# Patient Record
Sex: MALE | URBAN - METROPOLITAN AREA
[De-identification: names, ages, dates, MRNs, and addresses within clinical notes are randomized per-mention and may not be internally consistent; named-entity substitution may affect disease eponyms.]

---

## 2018-07-01 ENCOUNTER — OUTPATIENT (OUTPATIENT)
Dept: OUTPATIENT SERVICES | Facility: HOSPITAL | Age: 58
LOS: 1 days | End: 2018-07-01
Payer: MEDICARE

## 2018-07-12 DIAGNOSIS — Z71.89 OTHER SPECIFIED COUNSELING: ICD-10-CM

## 2019-10-01 PROCEDURE — G9005: CPT

## 2019-11-09 ENCOUNTER — INPATIENT (INPATIENT)
Facility: HOSPITAL | Age: 59
LOS: 0 days | DRG: 283 | End: 2019-11-10
Attending: STUDENT IN AN ORGANIZED HEALTH CARE EDUCATION/TRAINING PROGRAM | Admitting: STUDENT IN AN ORGANIZED HEALTH CARE EDUCATION/TRAINING PROGRAM
Payer: MEDICARE

## 2019-11-09 VITALS
DIASTOLIC BLOOD PRESSURE: 57 MMHG | SYSTOLIC BLOOD PRESSURE: 78 MMHG | HEART RATE: 114 BPM | HEIGHT: 73 IN | WEIGHT: 259.26 LBS

## 2019-11-09 DIAGNOSIS — N25.81 SECONDARY HYPERPARATHYROIDISM OF RENAL ORIGIN: ICD-10-CM

## 2019-11-09 DIAGNOSIS — N18.6 END STAGE RENAL DISEASE: ICD-10-CM

## 2019-11-09 DIAGNOSIS — I10 ESSENTIAL (PRIMARY) HYPERTENSION: ICD-10-CM

## 2019-11-09 DIAGNOSIS — I21.3 ST ELEVATION (STEMI) MYOCARDIAL INFARCTION OF UNSPECIFIED SITE: ICD-10-CM

## 2019-11-09 DIAGNOSIS — Z89.511 ACQUIRED ABSENCE OF RIGHT LEG BELOW KNEE: Chronic | ICD-10-CM

## 2019-11-09 LAB
ALBUMIN SERPL ELPH-MCNC: 3.3 G/DL — SIGNIFICANT CHANGE UP (ref 3.3–5)
ALBUMIN SERPL ELPH-MCNC: 3.3 G/DL — SIGNIFICANT CHANGE UP (ref 3.3–5)
ALBUMIN SERPL ELPH-MCNC: 3.6 G/DL — SIGNIFICANT CHANGE UP (ref 3.3–5)
ALP SERPL-CCNC: 83 U/L — SIGNIFICANT CHANGE UP (ref 40–120)
ALP SERPL-CCNC: 86 U/L — SIGNIFICANT CHANGE UP (ref 40–120)
ALP SERPL-CCNC: 88 U/L — SIGNIFICANT CHANGE UP (ref 40–120)
ALT FLD-CCNC: 13 U/L — SIGNIFICANT CHANGE UP (ref 10–45)
ALT FLD-CCNC: 16 U/L — SIGNIFICANT CHANGE UP (ref 10–45)
ALT FLD-CCNC: 21 U/L — SIGNIFICANT CHANGE UP (ref 10–45)
ANION GAP SERPL CALC-SCNC: 16 MMOL/L — SIGNIFICANT CHANGE UP (ref 5–17)
ANION GAP SERPL CALC-SCNC: 20 MMOL/L — HIGH (ref 5–17)
ANION GAP SERPL CALC-SCNC: 20 MMOL/L — HIGH (ref 5–17)
APTT BLD: 106.6 SEC — HIGH (ref 27.5–36.3)
APTT BLD: 52.9 SEC — HIGH (ref 27.5–36.3)
APTT BLD: 55.9 SEC — HIGH (ref 27.5–36.3)
AST SERPL-CCNC: 17 U/L — SIGNIFICANT CHANGE UP (ref 10–40)
AST SERPL-CCNC: 59 U/L — HIGH (ref 10–40)
AST SERPL-CCNC: 88 U/L — HIGH (ref 10–40)
BASE EXCESS BLDV CALC-SCNC: 2 MMOL/L — SIGNIFICANT CHANGE UP (ref -2–2)
BASOPHILS # BLD AUTO: 0.05 K/UL — SIGNIFICANT CHANGE UP (ref 0–0.2)
BASOPHILS NFR BLD AUTO: 0.5 % — SIGNIFICANT CHANGE UP (ref 0–2)
BILIRUB SERPL-MCNC: 0.4 MG/DL — SIGNIFICANT CHANGE UP (ref 0.2–1.2)
BLD GP AB SCN SERPL QL: NEGATIVE — SIGNIFICANT CHANGE UP
BUN SERPL-MCNC: 33 MG/DL — HIGH (ref 7–23)
BUN SERPL-MCNC: 36 MG/DL — HIGH (ref 7–23)
BUN SERPL-MCNC: 37 MG/DL — HIGH (ref 7–23)
CALCIUM SERPL-MCNC: 10.2 MG/DL — SIGNIFICANT CHANGE UP (ref 8.4–10.5)
CALCIUM SERPL-MCNC: 10.3 MG/DL — SIGNIFICANT CHANGE UP (ref 8.4–10.5)
CALCIUM SERPL-MCNC: 10.5 MG/DL — SIGNIFICANT CHANGE UP (ref 8.4–10.5)
CHLORIDE SERPL-SCNC: 92 MMOL/L — LOW (ref 96–108)
CHLORIDE SERPL-SCNC: 93 MMOL/L — LOW (ref 96–108)
CHLORIDE SERPL-SCNC: 94 MMOL/L — LOW (ref 96–108)
CHOLEST SERPL-MCNC: 132 MG/DL — SIGNIFICANT CHANGE UP (ref 10–199)
CK MB BLD-MCNC: 10 % — HIGH (ref 0–3.5)
CK MB BLD-MCNC: 10.2 % — HIGH (ref 0–3.5)
CK MB BLD-MCNC: 5 % — HIGH (ref 0–3.5)
CK MB CFR SERPL CALC: 5.8 NG/ML — SIGNIFICANT CHANGE UP (ref 0–6.7)
CK MB CFR SERPL CALC: 54.5 NG/ML — HIGH (ref 0–6.7)
CK MB CFR SERPL CALC: 76.2 NG/ML — HIGH (ref 0–6.7)
CK SERPL-CCNC: 115 U/L — SIGNIFICANT CHANGE UP (ref 30–200)
CK SERPL-CCNC: 543 U/L — HIGH (ref 30–200)
CK SERPL-CCNC: 746 U/L — HIGH (ref 30–200)
CO2 BLDV-SCNC: 28 MMOL/L — SIGNIFICANT CHANGE UP (ref 22–30)
CO2 SERPL-SCNC: 20 MMOL/L — LOW (ref 22–31)
CO2 SERPL-SCNC: 22 MMOL/L — SIGNIFICANT CHANGE UP (ref 22–31)
CO2 SERPL-SCNC: 23 MMOL/L — SIGNIFICANT CHANGE UP (ref 22–31)
CREAT SERPL-MCNC: 8.58 MG/DL — HIGH (ref 0.5–1.3)
CREAT SERPL-MCNC: 8.85 MG/DL — HIGH (ref 0.5–1.3)
CREAT SERPL-MCNC: 8.99 MG/DL — HIGH (ref 0.5–1.3)
EOSINOPHIL # BLD AUTO: 0.04 K/UL — SIGNIFICANT CHANGE UP (ref 0–0.5)
EOSINOPHIL NFR BLD AUTO: 0.4 % — SIGNIFICANT CHANGE UP (ref 0–6)
GAS PNL BLDV: SIGNIFICANT CHANGE UP
GLUCOSE BLDC GLUCOMTR-MCNC: 177 MG/DL — HIGH (ref 70–99)
GLUCOSE SERPL-MCNC: 194 MG/DL — HIGH (ref 70–99)
GLUCOSE SERPL-MCNC: 201 MG/DL — HIGH (ref 70–99)
GLUCOSE SERPL-MCNC: 222 MG/DL — HIGH (ref 70–99)
HBA1C BLD-MCNC: 6.2 % — HIGH (ref 4–5.6)
HCO3 BLDV-SCNC: 26 MMOL/L — SIGNIFICANT CHANGE UP (ref 21–29)
HCT VFR BLD CALC: 30.1 % — LOW (ref 39–50)
HDLC SERPL-MCNC: 67 MG/DL — SIGNIFICANT CHANGE UP
HGB BLD-MCNC: 9.5 G/DL — LOW (ref 13–17)
HOROWITZ INDEX BLDV+IHG-RTO: 40 — SIGNIFICANT CHANGE UP
IMM GRANULOCYTES NFR BLD AUTO: 0.3 % — SIGNIFICANT CHANGE UP (ref 0–1.5)
INR BLD: 1.18 RATIO — HIGH (ref 0.88–1.16)
INR BLD: 1.18 RATIO — HIGH (ref 0.88–1.16)
LACTATE SERPL-SCNC: 1.7 MMOL/L — SIGNIFICANT CHANGE UP (ref 0.7–2)
LIPID PNL WITH DIRECT LDL SERPL: 51 MG/DL — SIGNIFICANT CHANGE UP
LYMPHOCYTES # BLD AUTO: 0.58 K/UL — LOW (ref 1–3.3)
LYMPHOCYTES # BLD AUTO: 5.7 % — LOW (ref 13–44)
MAGNESIUM SERPL-MCNC: 2.9 MG/DL — HIGH (ref 1.6–2.6)
MAGNESIUM SERPL-MCNC: 3 MG/DL — HIGH (ref 1.6–2.6)
MAGNESIUM SERPL-MCNC: 3.1 MG/DL — HIGH (ref 1.6–2.6)
MCHC RBC-ENTMCNC: 28.6 PG — SIGNIFICANT CHANGE UP (ref 27–34)
MCHC RBC-ENTMCNC: 31.6 GM/DL — LOW (ref 32–36)
MCV RBC AUTO: 90.7 FL — SIGNIFICANT CHANGE UP (ref 80–100)
MONOCYTES # BLD AUTO: 0.91 K/UL — HIGH (ref 0–0.9)
MONOCYTES NFR BLD AUTO: 8.9 % — SIGNIFICANT CHANGE UP (ref 2–14)
NEUTROPHILS # BLD AUTO: 8.64 K/UL — HIGH (ref 1.8–7.4)
NEUTROPHILS NFR BLD AUTO: 84.2 % — HIGH (ref 43–77)
NRBC # BLD: 0 /100 WBCS — SIGNIFICANT CHANGE UP (ref 0–0)
PCO2 BLDV: 42 MMHG — SIGNIFICANT CHANGE UP (ref 35–50)
PH BLDV: 7.41 — SIGNIFICANT CHANGE UP (ref 7.35–7.45)
PHOSPHATE SERPL-MCNC: 4.4 MG/DL — SIGNIFICANT CHANGE UP (ref 2.5–4.5)
PHOSPHATE SERPL-MCNC: 4.5 MG/DL — SIGNIFICANT CHANGE UP (ref 2.5–4.5)
PHOSPHATE SERPL-MCNC: 4.8 MG/DL — HIGH (ref 2.5–4.5)
PLATELET # BLD AUTO: 344 K/UL — SIGNIFICANT CHANGE UP (ref 150–400)
PO2 BLDV: 34 MMHG — SIGNIFICANT CHANGE UP (ref 25–45)
POTASSIUM SERPL-MCNC: 4.4 MMOL/L — SIGNIFICANT CHANGE UP (ref 3.5–5.3)
POTASSIUM SERPL-MCNC: 4.5 MMOL/L — SIGNIFICANT CHANGE UP (ref 3.5–5.3)
POTASSIUM SERPL-MCNC: 4.5 MMOL/L — SIGNIFICANT CHANGE UP (ref 3.5–5.3)
POTASSIUM SERPL-SCNC: 4.4 MMOL/L — SIGNIFICANT CHANGE UP (ref 3.5–5.3)
POTASSIUM SERPL-SCNC: 4.5 MMOL/L — SIGNIFICANT CHANGE UP (ref 3.5–5.3)
POTASSIUM SERPL-SCNC: 4.5 MMOL/L — SIGNIFICANT CHANGE UP (ref 3.5–5.3)
PROT SERPL-MCNC: 6.6 G/DL — SIGNIFICANT CHANGE UP (ref 6–8.3)
PROT SERPL-MCNC: 7.1 G/DL — SIGNIFICANT CHANGE UP (ref 6–8.3)
PROT SERPL-MCNC: 7.1 G/DL — SIGNIFICANT CHANGE UP (ref 6–8.3)
PROTHROM AB SERPL-ACNC: 13.5 SEC — HIGH (ref 10–12.9)
PROTHROM AB SERPL-ACNC: 13.6 SEC — HIGH (ref 10–12.9)
RBC # BLD: 3.32 M/UL — LOW (ref 4.2–5.8)
RBC # FLD: 14.8 % — HIGH (ref 10.3–14.5)
RH IG SCN BLD-IMP: POSITIVE — SIGNIFICANT CHANGE UP
SAO2 % BLDV: 62 % — LOW (ref 67–88)
SODIUM SERPL-SCNC: 132 MMOL/L — LOW (ref 135–145)
SODIUM SERPL-SCNC: 133 MMOL/L — LOW (ref 135–145)
SODIUM SERPL-SCNC: 135 MMOL/L — SIGNIFICANT CHANGE UP (ref 135–145)
TOTAL CHOLESTEROL/HDL RATIO MEASUREMENT: 2 RATIO — LOW (ref 3.4–9.6)
TRIGL SERPL-MCNC: 68 MG/DL — SIGNIFICANT CHANGE UP (ref 10–149)
TROPONIN T, HIGH SENSITIVITY RESULT: 1451 NG/L — HIGH (ref 0–51)
TROPONIN T, HIGH SENSITIVITY RESULT: 288 NG/L — HIGH (ref 0–51)
TROPONIN T, HIGH SENSITIVITY RESULT: 841 NG/L — HIGH (ref 0–51)
TSH SERPL-MCNC: 0.77 UIU/ML — SIGNIFICANT CHANGE UP (ref 0.27–4.2)
VANCOMYCIN TROUGH SERPL-MCNC: 16.3 UG/ML — SIGNIFICANT CHANGE UP (ref 10–20)
WBC # BLD: 10.25 K/UL — SIGNIFICANT CHANGE UP (ref 3.8–10.5)
WBC # FLD AUTO: 10.25 K/UL — SIGNIFICANT CHANGE UP (ref 3.8–10.5)

## 2019-11-09 PROCEDURE — 93010 ELECTROCARDIOGRAM REPORT: CPT | Mod: 76

## 2019-11-09 PROCEDURE — 99223 1ST HOSP IP/OBS HIGH 75: CPT | Mod: GC

## 2019-11-09 PROCEDURE — 71045 X-RAY EXAM CHEST 1 VIEW: CPT | Mod: 26,76

## 2019-11-09 PROCEDURE — 93306 TTE W/DOPPLER COMPLETE: CPT | Mod: 26

## 2019-11-09 PROCEDURE — 99222 1ST HOSP IP/OBS MODERATE 55: CPT | Mod: GC

## 2019-11-09 RX ORDER — CLOPIDOGREL BISULFATE 75 MG/1
75 TABLET, FILM COATED ORAL DAILY
Refills: 0 | Status: DISCONTINUED | OUTPATIENT
Start: 2019-11-09 | End: 2019-11-09

## 2019-11-09 RX ORDER — HEPARIN SODIUM 5000 [USP'U]/ML
4000 INJECTION INTRAVENOUS; SUBCUTANEOUS EVERY 6 HOURS
Refills: 0 | Status: DISCONTINUED | OUTPATIENT
Start: 2019-11-09 | End: 2019-11-10

## 2019-11-09 RX ORDER — DEXTROSE 50 % IN WATER 50 %
12.5 SYRINGE (ML) INTRAVENOUS ONCE
Refills: 0 | Status: DISCONTINUED | OUTPATIENT
Start: 2019-11-09 | End: 2019-11-10

## 2019-11-09 RX ORDER — DEXTROSE 50 % IN WATER 50 %
25 SYRINGE (ML) INTRAVENOUS ONCE
Refills: 0 | Status: DISCONTINUED | OUTPATIENT
Start: 2019-11-09 | End: 2019-11-10

## 2019-11-09 RX ORDER — CHLORHEXIDINE GLUCONATE 213 G/1000ML
1 SOLUTION TOPICAL
Refills: 0 | Status: DISCONTINUED | OUTPATIENT
Start: 2019-11-09 | End: 2019-11-09

## 2019-11-09 RX ORDER — VANCOMYCIN HCL 1 G
1000 VIAL (EA) INTRAVENOUS ONCE
Refills: 0 | Status: COMPLETED | OUTPATIENT
Start: 2019-11-09 | End: 2019-11-09

## 2019-11-09 RX ORDER — INSULIN LISPRO 100/ML
VIAL (ML) SUBCUTANEOUS
Refills: 0 | Status: DISCONTINUED | OUTPATIENT
Start: 2019-11-09 | End: 2019-11-10

## 2019-11-09 RX ORDER — NOREPINEPHRINE BITARTRATE/D5W 8 MG/250ML
0.05 PLASTIC BAG, INJECTION (ML) INTRAVENOUS
Qty: 8 | Refills: 0 | Status: DISCONTINUED | OUTPATIENT
Start: 2019-11-09 | End: 2019-11-10

## 2019-11-09 RX ORDER — ATORVASTATIN CALCIUM 80 MG/1
80 TABLET, FILM COATED ORAL AT BEDTIME
Refills: 0 | Status: DISCONTINUED | OUTPATIENT
Start: 2019-11-09 | End: 2019-11-10

## 2019-11-09 RX ORDER — DEXTROSE 50 % IN WATER 50 %
15 SYRINGE (ML) INTRAVENOUS ONCE
Refills: 0 | Status: DISCONTINUED | OUTPATIENT
Start: 2019-11-09 | End: 2019-11-10

## 2019-11-09 RX ORDER — CLOPIDOGREL BISULFATE 75 MG/1
75 TABLET, FILM COATED ORAL DAILY
Refills: 0 | Status: DISCONTINUED | OUTPATIENT
Start: 2019-11-10 | End: 2019-11-10

## 2019-11-09 RX ORDER — AMLODIPINE BESYLATE 2.5 MG/1
1 TABLET ORAL
Qty: 0 | Refills: 0 | DISCHARGE

## 2019-11-09 RX ORDER — CLOPIDOGREL BISULFATE 75 MG/1
75 TABLET, FILM COATED ORAL ONCE
Refills: 0 | Status: DISCONTINUED | OUTPATIENT
Start: 2019-11-09 | End: 2019-11-09

## 2019-11-09 RX ORDER — ASPIRIN/CALCIUM CARB/MAGNESIUM 324 MG
81 TABLET ORAL DAILY
Refills: 0 | Status: DISCONTINUED | OUTPATIENT
Start: 2019-11-10 | End: 2019-11-10

## 2019-11-09 RX ORDER — INSULIN LISPRO 100/ML
VIAL (ML) SUBCUTANEOUS AT BEDTIME
Refills: 0 | Status: DISCONTINUED | OUTPATIENT
Start: 2019-11-09 | End: 2019-11-10

## 2019-11-09 RX ORDER — PIPERACILLIN AND TAZOBACTAM 4; .5 G/20ML; G/20ML
3.38 INJECTION, POWDER, LYOPHILIZED, FOR SOLUTION INTRAVENOUS EVERY 12 HOURS
Refills: 0 | Status: DISCONTINUED | OUTPATIENT
Start: 2019-11-09 | End: 2019-11-10

## 2019-11-09 RX ORDER — PIPERACILLIN AND TAZOBACTAM 4; .5 G/20ML; G/20ML
3.38 INJECTION, POWDER, LYOPHILIZED, FOR SOLUTION INTRAVENOUS ONCE
Refills: 0 | Status: COMPLETED | OUTPATIENT
Start: 2019-11-09 | End: 2019-11-09

## 2019-11-09 RX ORDER — SIMVASTATIN 20 MG/1
1 TABLET, FILM COATED ORAL
Qty: 0 | Refills: 0 | DISCHARGE

## 2019-11-09 RX ORDER — HEPARIN SODIUM 5000 [USP'U]/ML
INJECTION INTRAVENOUS; SUBCUTANEOUS
Qty: 25000 | Refills: 0 | Status: DISCONTINUED | OUTPATIENT
Start: 2019-11-09 | End: 2019-11-10

## 2019-11-09 RX ORDER — ASPIRIN/CALCIUM CARB/MAGNESIUM 324 MG
81 TABLET ORAL DAILY
Refills: 0 | Status: DISCONTINUED | OUTPATIENT
Start: 2019-11-09 | End: 2019-11-09

## 2019-11-09 RX ORDER — HYDRALAZINE HCL 50 MG
1 TABLET ORAL
Qty: 0 | Refills: 0 | DISCHARGE

## 2019-11-09 RX ORDER — GLUCAGON INJECTION, SOLUTION 0.5 MG/.1ML
1 INJECTION, SOLUTION SUBCUTANEOUS ONCE
Refills: 0 | Status: DISCONTINUED | OUTPATIENT
Start: 2019-11-09 | End: 2019-11-10

## 2019-11-09 RX ORDER — SODIUM CHLORIDE 9 MG/ML
1000 INJECTION, SOLUTION INTRAVENOUS
Refills: 0 | Status: DISCONTINUED | OUTPATIENT
Start: 2019-11-09 | End: 2019-11-10

## 2019-11-09 RX ORDER — CHLORHEXIDINE GLUCONATE 213 G/1000ML
1 SOLUTION TOPICAL AT BEDTIME
Refills: 0 | Status: DISCONTINUED | OUTPATIENT
Start: 2019-11-09 | End: 2019-11-10

## 2019-11-09 RX ORDER — INSULIN DETEMIR 100/ML (3)
15 INSULIN PEN (ML) SUBCUTANEOUS
Qty: 0 | Refills: 0 | DISCHARGE

## 2019-11-09 RX ADMIN — ATORVASTATIN CALCIUM 80 MILLIGRAM(S): 80 TABLET, FILM COATED ORAL at 21:07

## 2019-11-09 RX ADMIN — Medication 11.03 MICROGRAM(S)/KG/MIN: at 21:08

## 2019-11-09 RX ADMIN — Medication 11.03 MICROGRAM(S)/KG/MIN: at 09:42

## 2019-11-09 RX ADMIN — Medication 2: at 18:47

## 2019-11-09 RX ADMIN — Medication 250 MILLIGRAM(S): at 09:41

## 2019-11-09 RX ADMIN — HEPARIN SODIUM 1000 UNIT(S)/HR: 5000 INJECTION INTRAVENOUS; SUBCUTANEOUS at 15:27

## 2019-11-09 RX ADMIN — Medication 2: at 15:23

## 2019-11-09 RX ADMIN — HEPARIN SODIUM 1000 UNIT(S)/HR: 5000 INJECTION INTRAVENOUS; SUBCUTANEOUS at 07:34

## 2019-11-09 RX ADMIN — PIPERACILLIN AND TAZOBACTAM 25 GRAM(S): 4; .5 INJECTION, POWDER, LYOPHILIZED, FOR SOLUTION INTRAVENOUS at 17:58

## 2019-11-09 RX ADMIN — HEPARIN SODIUM 1000 UNIT(S)/HR: 5000 INJECTION INTRAVENOUS; SUBCUTANEOUS at 18:48

## 2019-11-09 RX ADMIN — PIPERACILLIN AND TAZOBACTAM 200 GRAM(S): 4; .5 INJECTION, POWDER, LYOPHILIZED, FOR SOLUTION INTRAVENOUS at 09:37

## 2019-11-09 NOTE — H&P ADULT - NSHPLABSRESULTS_GEN_ALL_CORE
LABS:                          9.5    10.25 )-----------( 344      ( 09 Nov 2019 06:25 )             30.1       11-09    135  |  93<L>  |  33<H>  ----------------------------<  201<H>  4.4   |  22  |  8.58<H>    Ca    10.3      09 Nov 2019 06:25  Phos  4.4     11-09  Mg     2.9     11-09    TPro  7.1  /  Alb  3.6  /  TBili  0.4  /  DBili  x   /  AST  17  /  ALT  13  /  AlkPhos  88  11-09       LIVER FUNCTIONS - ( 09 Nov 2019 06:25 )  Alb: 3.6 g/dL / Pro: 7.1 g/dL / ALK PHOS: 88 U/L / ALT: 13 U/L / AST: 17 U/L / GGT: x                        PT/INR - ( 09 Nov 2019 06:25 )   PT: 13.6 sec;   INR: 1.18 ratio         PTT - ( 09 Nov 2019 06:25 )  PTT:106.6 sec    Lactate Trend  11-09 @ 06:25 Lactate:1.7       CARDIAC MARKERS ( 09 Nov 2019 06:25 )  x     / x     / 115 U/L / x     / 5.8 ng/mL        CAPILLARY BLOOD GLUCOSE                  RADIOLOGY & ADDITIONAL TESTS: Reviewed LABS:                          9.5    10.25 )-----------( 344      ( 09 Nov 2019 06:25 )             30.1       11-09    135  |  93<L>  |  33<H>  ----------------------------<  201<H>  4.4   |  22  |  8.58<H>    Ca    10.3      09 Nov 2019 06:25  Phos  4.4     11-09  Mg     2.9     11-09    TPro  7.1  /  Alb  3.6  /  TBili  0.4  /  DBili  x   /  AST  17  /  ALT  13  /  AlkPhos  88  11-09       LIVER FUNCTIONS - ( 09 Nov 2019 06:25 )  Alb: 3.6 g/dL / Pro: 7.1 g/dL / ALK PHOS: 88 U/L / ALT: 13 U/L / AST: 17 U/L / GGT: x                        PT/INR - ( 09 Nov 2019 06:25 )   PT: 13.6 sec;   INR: 1.18 ratio         PTT - ( 09 Nov 2019 06:25 )  PTT:106.6 sec    Lactate Trend  11-09 @ 06:25 Lactate:1.7       CARDIAC MARKERS ( 09 Nov 2019 06:25 )  x     / x     / 115 U/L / x     / 5.8 ng/mL    Troponin 288    CAPILLARY BLOOD GLUCOSE      Verbal report from OSH:    CKMB 1.7      RADIOLOGY & ADDITIONAL TESTS:  < from: Xray Chest 1 View- PORTABLE-Urgent (11.09.19 @ 07:02) >    IMPRESSION:  Mild pulmonary vascular congestion. Elevated right hemidiaphragm.    < end of copied text >     Per verbal report from OSH:  -RUQ US demonstrated cholelithiasis  -HIDA scan was negative

## 2019-11-09 NOTE — H&P ADULT - NSICDXPASTMEDICALHX_GEN_ALL_CORE_FT
PAST MEDICAL HISTORY:  ESRD on dialysis     HLD (hyperlipidemia)     HTN (hypertension)     Peripheral vascular disease L femoral stent    Type 2 diabetes mellitus

## 2019-11-09 NOTE — H&P ADULT - ASSESSMENT
Assessment:   57 yo M     Plan:     #Cardiovascular  -Pump    -Valves    -Vessels    #Neuro    #Pulm    #GI    #Renal/    #ID    #MSK    #Endocrine    #PPX    #GOC    Alex Cardenas, PGY1 Assessment:   59 yo M with AS, PVD, HTN, HLD, and DM2 presented to OSH with biliary colic transferred to [facility] for NSTEMI with high risk features on EKG.    Plan:   #Cardiovascular  Acute systolic heart failure in the setting of hypotension requiring pressors  -F/u echo  -C/w levophed    NSTEMI with high risk features on EKG  -ASA 81 daily starting 11/10 (loaded 11/9 AM OSH)  -Plavix 75 mg daily (loaded 11/9 AM OSH)  -Heparin ggt  -Trend cardiac enzymes 8h  -Will need LHC this admission    Aortic stenosis  -Follow up TTE  -NPO at MN for ROSA  tomorrow    #Neuro  No active issues    #Pulm  No active issues    #GI  Biliary colic  -Negative valdovinos sign  -Continue to monitor serial exams  -RUQ US as in ID    #Renal/  ESRD on dialysis T/Th/F  -Requiring pressors now without acute need for HD  -Hold on dialysis for now in the absence of clear indication  -Continue to monitor    #ID  Fever in following two days off cipro and flagyl at OSH with abdominal pain and hypotension  -Vancomycin (11/9 - )  -Zosyn (11/9 - )  -Blood cultures x2  -Urinalysis  -F/u formal read on CXR  -Limited abdominal US    #Endocrine  T2DM  -SSI, will likely require basal bolus, but need to determine requirements in ICU  -Continue to monitor    #PPX  Heparin drip    #GOC  Full code    Alex Cardenas, PGY1 Assessment:   57 yo M with AS, PVD, HTN, HLD, and DM2 presented to OSH with biliary colic transferred to [facility] for NSTEMI with high risk features on EKG.    Plan:   #Cardiovascular  Acute systolic heart failure in the setting of hypotension requiring pressors  -F/u echo  -C/w levophed    NSTEMI with high risk features on EKG  -ASA 81 daily starting 11/10 (loaded 11/9 AM OSH)  -Plavix 75 mg daily (loaded 11/9 AM OSH)  -Heparin ggt  -Trend cardiac enzymes 8h  -Will need LHC this admission    Aortic stenosis  -Follow up TTE  -NPO at MN for ROSA  tomorrow    #Neuro  No active issues    #Pulm  No active issues    #GI  Biliary colic  -Negative valdovinos sign  -Continue to monitor serial exams  -RUQ US as in ID    #Renal/  ESRD on dialysis T/Th/F  -Requiring pressors now without acute need for HD  -Hold on dialysis for now in the absence of clear indication  -Continue to monitor    #ID  Fever in following two days off cipro and flagyl at OSH with abdominal pain and hypotension  -Vancomycin (11/9 - ), dose by level ESRD  -Zosyn (11/9 - ), 3.175 mg BID for renal dosage  -Blood cultures x2  -Urinalysis  -F/u formal read on CXR  -Limited abdominal US    #Endocrine  T2DM  -SSI, will likely require basal bolus, but need to determine requirements in ICU  -Continue to monitor    #PPX  Heparin drip    #GOC  Full code    Alex Cardenas, PGY1

## 2019-11-09 NOTE — H&P ADULT - ATTENDING COMMENTS
Patient is seen and examined with fellow, NP and the CCU house-staff. I agree with the history, physical and the assessment and plan.  unstable angina with high risk ECG  possible demand ischemic in setting of valvular disease  NPO post MN on sunday for possible ROSA  on abx  bl cx penig  on pressor support for shock  no urgent need for renal replacement at this time    >30 minutes of crital care time spent with the patient

## 2019-11-09 NOTE — H&P ADULT - NSHPREVIEWOFSYSTEMS_GEN_ALL_CORE
REVIEW OF SYSTEMS:    CONSTITUTIONAL: No weight loss, fevers or chills  EYES/ENT: No visual changes;  No throat pain   RESPIRATORY: +SOB  CARDIOVASCULAR: +CP, -palpitations  GASTROINTESTINAL: No abdominal pain; nausea, vomiting;  +diarrhea. No hemetemesis, melena or hematochezia.  GENITOURINARY: Anuric  SKIN: No new rashes  Heme: No gingival bleeding  All other review of systems is negative unless indicated above.

## 2019-11-09 NOTE — CONSULT NOTE ADULT - SUBJECTIVE AND OBJECTIVE BOX
[facility] DIVISION OF KIDNEY DISEASES AND HYPERTENSION -- INITIAL CONSULT NOTE  --------------------------------------------------------------------------------  HPI: Patient is a 58y old male with a PMHx of PVD s/p L femoral stent and BKA, T2DM, HTN, HLD, and ESRD TIW (TTS) who presented to [facility] with abdominal pain and was transferred to [facility] for STEMI that was determined to be, most likely, demand mediated ischemia. He was initially admitted on 10/30 to [facility] for RUQ pain. Ultrasound demonstrated cholelithiasis without cholecystitis. HIDA scan was negative. He was treated with flagyl from 11/1-11/4 and cipro 11/1-11/5. He was deemed not to be a surgical candidate for lap ella for gallstones due to multiple comorbidities. He was pain free and afebrile from 11/5 through 11/8 when he was planned for discharge to rehab. He last received HD on 11/7, does not recall his Nephrologist name currently but states he gets HD at [facility]. Has been on HD for 6 years via LUE AVF, usually gets UF 1.5-2L.  On 11/9, he began to have 9/10 substernal chest pain mainly on the left chest that also radiated to his right. He was loaded with , plavix 600 mg, and heparin 5000. He was found to have TERI in leads VI, V2, and AVR. In transit, he was hypotensive to the 80s and received 200 mL of fluids. Currently pt states his CP is improved, endorses mild SOB. BP stable, 100/60's, satting 100% on NRB, febrile to 101.6.          PAST HISTORY  --------------------------------------------------------------------------------  PAST MEDICAL & SURGICAL HISTORY:    FAMILY HISTORY:    PAST SOCIAL HISTORY:    ALLERGIES & MEDICATIONS  --------------------------------------------------------------------------------  Allergies    No Known Allergies    Intolerances      Standing Inpatient Medications  chlorhexidine 2% Cloths 1 Application(s) Topical at bedtime  dextrose 5%. 1000 milliLiter(s) IV Continuous <Continuous>  dextrose 50% Injectable 12.5 Gram(s) IV Push once  dextrose 50% Injectable 25 Gram(s) IV Push once  dextrose 50% Injectable 25 Gram(s) IV Push once  heparin  Infusion.  Unit(s)/Hr IV Continuous <Continuous>  insulin lispro (HumaLOG) corrective regimen sliding scale   SubCutaneous three times a day before meals  insulin lispro (HumaLOG) corrective regimen sliding scale   SubCutaneous at bedtime  piperacillin/tazobactam IVPB. 3.375 Gram(s) IV Intermittent once  vancomycin  IVPB 1000 milliGRAM(s) IV Intermittent once    PRN Inpatient Medications  dextrose 40% Gel 15 Gram(s) Oral once PRN  glucagon  Injectable 1 milliGRAM(s) IntraMuscular once PRN  heparin  Injectable 4000 Unit(s) IV Push every 6 hours PRN      REVIEW OF SYSTEMS  --------------------------------------------------------------------------------  Gen: No lethargy  Respiratory: + dyspnea  CV: +chest pain  GI: No abdominal pain/ diarrhea   MSK: No edema, + BKA  Neuro: No dizziness  Heme: No bleeding    All other systems were reviewed and are negative, except as noted.    VITALS/PHYSICAL EXAM  --------------------------------------------------------------------------------  T(C): 38.7 (11-09-19 @ 05:44), Max: 38.7 (11-09-19 @ 05:44)  HR: 106 (11-09-19 @ 08:07) (96 - 116)  BP: 111/73 (11-09-19 @ 08:07) (62/45 - 111/73)  RR: 11 (11-09-19 @ 08:07) (11 - 32)  SpO2: 100% (11-09-19 @ 08:07) (92% - 100%)  Wt(kg): --  Height (cm): 185.4 (11-09-19 @ 05:35)  Weight (kg): 117.6 (11-09-19 @ 05:35)  BMI (kg/m2): 34.2 (11-09-19 @ 05:35)  BSA (m2): 2.4 (11-09-19 @ 05:35)      Physical Exam:    	Gen: Mild discomfort noted  	HEENT: Anicteric, on NRB  	Pulm: Crackles in bases  	CV: RRR, S1S2; no rub  	Abd: +BS, soft, nontender/nondistended       	: No suprapubic tenderness  	MSK: Warm, no edema, + BKA  	Neuro: Awake, Alert       	Vascular Access: LUE AVF with weak thrill, scab noted, no purulence       LABS/STUDIES  --------------------------------------------------------------------------------              9.5    10.25 >-----------<  344      [11-09-19 @ 06:25]              30.1     135  |  93  |  33  ----------------------------<  201      [11-09-19 @ 06:25]  4.4   |  22  |  8.58        Ca     10.3     [11-09-19 @ 06:25]      Mg     2.9     [11-09-19 @ 06:25]      Phos  4.4     [11-09-19 @ 06:25]    TPro  7.1  /  Alb  3.6  /  TBili  0.4  /  DBili  x   /  AST  17  /  ALT  13  /  AlkPhos  88  [11-09-19 @ 06:25]    PT/INR: PT 13.6 , INR 1.18       [11-09-19 @ 06:25]  PTT: 106.6      [11-09-19 @ 06:25]          [11-09-19 @ 06:25]    Creatinine Trend:  SCr 8.58 [11-09 @ 06:25] [facility] DIVISION OF KIDNEY DISEASES AND HYPERTENSION -- INITIAL CONSULT NOTE  --------------------------------------------------------------------------------  HPI: Patient is a 58y old male with a PMHx of PVD s/p L femoral stent and BKA, T2DM, HTN, HLD, and ESRD TIW (TTS) who presented to [facility] with abdominal pain and was transferred to [facility] for STEMI that was determined to be, most likely, demand mediated ischemia. He was initially admitted on 10/30 to [facility] for RUQ pain. Ultrasound demonstrated cholelithiasis without cholecystitis. HIDA scan was negative. He was treated with flagyl from 11/1-11/4 and cipro 11/1-11/5. He was deemed not to be a surgical candidate for lap ella for gallstones due to multiple comorbidities. He was pain free and afebrile from 11/5 through 11/8 when he was planned for discharge to rehab. He last received HD on 11/7, does not recall his Nephrologist name currently but states he gets HD at [facility]. Has been on HD for 6 years via LUE AVF, usually gets UF 1.5-2L.  On 11/9, he began to have 9/10 substernal chest pain mainly on the left chest that also radiated to his right. He was loaded with , plavix 600 mg, and heparin 5000. He was found to have TERI in leads VI, V2, and AVR. In transit, he was hypotensive to the 80s and received 200 mL of fluids. Currently pt states his CP is improved, endorses mild SOB. BP stable, 100/60's, satting 100% on NRB, febrile to 101.6.          PAST HISTORY  --------------------------------------------------------------------------------  PAST MEDICAL & SURGICAL HISTORY: R BKA    FAMILY HISTORY: N/A    PAST SOCIAL HISTORY: Active smoker, 10 pack year hx, no etoh. Occasional marijuana use    ALLERGIES & MEDICATIONS  --------------------------------------------------------------------------------  Allergies    No Known Allergies    Intolerances      Standing Inpatient Medications  chlorhexidine 2% Cloths 1 Application(s) Topical at bedtime  dextrose 5%. 1000 milliLiter(s) IV Continuous <Continuous>  dextrose 50% Injectable 12.5 Gram(s) IV Push once  dextrose 50% Injectable 25 Gram(s) IV Push once  dextrose 50% Injectable 25 Gram(s) IV Push once  heparin  Infusion.  Unit(s)/Hr IV Continuous <Continuous>  insulin lispro (HumaLOG) corrective regimen sliding scale   SubCutaneous three times a day before meals  insulin lispro (HumaLOG) corrective regimen sliding scale   SubCutaneous at bedtime  piperacillin/tazobactam IVPB. 3.375 Gram(s) IV Intermittent once  vancomycin  IVPB 1000 milliGRAM(s) IV Intermittent once    PRN Inpatient Medications  dextrose 40% Gel 15 Gram(s) Oral once PRN  glucagon  Injectable 1 milliGRAM(s) IntraMuscular once PRN  heparin  Injectable 4000 Unit(s) IV Push every 6 hours PRN      REVIEW OF SYSTEMS  --------------------------------------------------------------------------------  Gen: No lethargy  Respiratory: + dyspnea  CV: +chest pain  GI: No abdominal pain/ diarrhea   MSK: No edema, + BKA  Neuro: No dizziness  Heme: No bleeding    All other systems were reviewed and are negative, except as noted.    VITALS/PHYSICAL EXAM  --------------------------------------------------------------------------------  T(C): 38.7 (11-09-19 @ 05:44), Max: 38.7 (11-09-19 @ 05:44)  HR: 106 (11-09-19 @ 08:07) (96 - 116)  BP: 111/73 (11-09-19 @ 08:07) (62/45 - 111/73)  RR: 11 (11-09-19 @ 08:07) (11 - 32)  SpO2: 100% (11-09-19 @ 08:07) (92% - 100%)  Wt(kg): --  Height (cm): 185.4 (11-09-19 @ 05:35)  Weight (kg): 117.6 (11-09-19 @ 05:35)  BMI (kg/m2): 34.2 (11-09-19 @ 05:35)  BSA (m2): 2.4 (11-09-19 @ 05:35)      Physical Exam:    	Gen: Mild discomfort noted  	HEENT: Anicteric, on NRB  	Pulm: Crackles in bases  	CV: RRR, S1S2; no rub  	Abd: +BS, soft, nontender/nondistended       	: No suprapubic tenderness  	MSK: Warm, no edema, + BKA  	Neuro: Awake, Alert       	Vascular Access: LUE AVF with weak thrill, scab noted, no purulence       LABS/STUDIES  --------------------------------------------------------------------------------              9.5    10.25 >-----------<  344      [11-09-19 @ 06:25]              30.1     135  |  93  |  33  ----------------------------<  201      [11-09-19 @ 06:25]  4.4   |  22  |  8.58        Ca     10.3     [11-09-19 @ 06:25]      Mg     2.9     [11-09-19 @ 06:25]      Phos  4.4     [11-09-19 @ 06:25]    TPro  7.1  /  Alb  3.6  /  TBili  0.4  /  DBili  x   /  AST  17  /  ALT  13  /  AlkPhos  88  [11-09-19 @ 06:25]    PT/INR: PT 13.6 , INR 1.18       [11-09-19 @ 06:25]  PTT: 106.6      [11-09-19 @ 06:25]          [11-09-19 @ 06:25]    Creatinine Trend:  SCr 8.58 [11-09 @ 06:25]

## 2019-11-09 NOTE — CHART NOTE - NSCHARTNOTEFT_GEN_A_CORE
====================  CCU MIDNIGHT ROUNDS  ====================    GOGO GAXIOLA  [id number]    ====================  SUMMARY:  ====================        ====================  NEW EVENTS:  ====================        ====================  VITALS (Last 12 hrs):  ====================    T(C): 36.9 (11-09-19 @ 20:10), Max: 37.2 (11-09-19 @ 15:22)  HR: 106 (11-09-19 @ 23:10) (100 - 108)  BP: 111/57 (11-09-19 @ 23:10) (69/53 - 111/57)  BP(mean): 82 (11-09-19 @ 23:10) (58 - 82)  ABP: --  ABP(mean): --  RR: 25 (11-09-19 @ 23:10) (8 - 31)  SpO2: 92% (11-09-19 @ 23:10) (90% - 96%)  Wt(kg): --  CVP(mm Hg): --  CO: --  CI: --  PA: --  PA(mean): --  PA(direct): --  PCWP: --  SVR: --    TELEMETRY:        *BLOOD GAS/ARTERIAL/MIXED/VENOUS  *LACTATE    I&O's Summary    09 Nov 2019 07:01  -  09 Nov 2019 23:56  --------------------------------------------------------  IN: 860 mL / OUT: 0 mL / NET: 860 mL        ====================  PLAN:  ====================    Subha RAMIRES-BC/CCU  spectra #[id number]  beeper #[id number] ====================  CCU MIDNIGHT ROUNDS  ====================    GOGO GAXIOLA  [id number]    ====================  SUMMARY:  ====================    58 M sev AS, ESRD (LUE fistula, HD T Th Fri), R BKA, DM2, PVD (L fem stent), HTN, HLD, @ [facility] w/ biliary colic/cholangitis s/p flagyl/cipro. tx to [facility] for NSTEMI w/ dynamic EKG chnages, febrile req pressors 11/9 on zoyn     ====================  NEW EVENTS:  ====================        ====================  VITALS (Last 12 hrs):  ====================    T(C): 36.9 (11-09-19 @ 20:10), Max: 37.2 (11-09-19 @ 15:22)  HR: 106 (11-09-19 @ 23:10) (100 - 108)  BP: 111/57 (11-09-19 @ 23:10) (69/53 - 111/57)  BP(mean): 82 (11-09-19 @ 23:10) (58 - 82)  RR: 25 (11-09-19 @ 23:10) (8 - 31)  SpO2: 92% (11-09-19 @ 23:10) (90% - 96%)    TELEMETRY: demetrius tach     I&O's Summary    09 Nov 2019 07:01  -  09 Nov 2019 23:56  --------------------------------------------------------  IN: 860 mL / OUT: 0 mL / NET: 860 mL    ====================  PLAN:  ====================  - zosyn, abd sono, f/u cx, wean lev oas tolerated   - DAPT, lipitor, hep gtt, trend CE   - NPO MN for ROSA   - possible [facility]     Subha Gonsales AGAEMMAP-BC/CCU  spectra #[id number]  beeper #[id number]

## 2019-11-09 NOTE — H&P ADULT - NSHPPHYSICALEXAM_GEN_ALL_CORE
PHYSICAL EXAM:    Vital Signs Last 24 Hrs  T(C): 38.7 (09 Nov 2019 05:44), Max: 38.7 (09 Nov 2019 05:44)  T(F): 101.6 (09 Nov 2019 05:44), Max: 101.6 (09 Nov 2019 05:44)  HR: 110 (09 Nov 2019 07:00) (110 - 116)  BP: 81/61 (09 Nov 2019 06:25) (78/57 - 96/68)  BP(mean): 66 (09 Nov 2019 06:25) (62 - 76)  RR: 16 (09 Nov 2019 07:00) (16 - 29)  SpO2: 96% (09 Nov 2019 07:00) (96% - 99%)    General: Lying in bed at 30 degrees in NAD  HEENT: NCAT.  PERRL.  EOMI.  No scleral icterus or injection.  Moist MM.  Neck: External jugular at 7 cm  Heart: RRR.  Normal S1 and S2.  No murmurs, rubs, or gallops.   Lungs: CTAB bilaterally.  Abdomen: Soft. Mild RUQ tenderness. Negative valdovinos's sign  Skin: Warm and dry.  No rashes.  Extremities: No edema, clubbing, or cyanosis.  2+ peripheral pulses b/l.  Musculoskeletal: No deformities.  No spinal or paraspinal tenderness.  Neuro: A&Ox3.  CN II-XII intact.  Tactile sensation intact. PHYSICAL EXAM:    Vital Signs Last 24 Hrs  T(C): 38.7 (09 Nov 2019 05:44), Max: 38.7 (09 Nov 2019 05:44)  T(F): 101.6 (09 Nov 2019 05:44), Max: 101.6 (09 Nov 2019 05:44)  HR: 110 (09 Nov 2019 07:00) (110 - 116)  BP: 81/61 (09 Nov 2019 06:25) (78/57 - 96/68)  BP(mean): 66 (09 Nov 2019 06:25) (62 - 76)  RR: 16 (09 Nov 2019 07:00) (16 - 29)  SpO2: 96% (09 Nov 2019 07:00) (96% - 99%)    General: Lying in bed at 30 degrees in NAD  HEENT: NCAT.  PERRL.  EOMI.  No scleral icterus or injection.  Moist MM.  Neck: External jugular at 7 cm  Heart: RRR.  Normal S1 and S2.  Holosystolic ejection murmur.   Lungs: CTAB bilaterally.  Abdomen: Soft. Mild RUQ tenderness. Negative valdovinos's sign  Skin: Warm and dry.  No rashes.  Extremities: No edema, clubbing, or cyanosis.  2+ peripheral pulses b/l.  Musculoskeletal: No deformities.  No spinal or paraspinal tenderness.  Neuro: A&Ox3.  CN II-XII intact.  Tactile sensation intact.

## 2019-11-09 NOTE — H&P ADULT - HISTORY OF PRESENT ILLNESS
Mr. Robertson is a 57 yo M with a PMH of PVD s/p L femoral stent and BKA, T2DM, HTN, HLD, and ESRD (T/Th/F) who presented to [facility] with abdominal pain and was transferred to [facility] for STEMI that was determined to be, most likely, demand mediated ischemia.    On 10/30, he presented for RUQ pain. Ultrasound demonstrated cholelithiasis without cholecystitis. HIDA scan was negative. He was treated with flagyl from 11/1-11/4 and cipro 11/1-11/5. He was deemed not to be a surgical candidate for lap ella for renal stones due to multiple comorbidities. He was pain free and afebrile from 11/5 through 11/8 when he was planned for discharge to rehab. He last received dialysis on Thursday 11/7    Then, at 3:50am on 11/9, he began to have 9/10 substernal chest pain mainly on the left chest that also radiated to his right. He was loaded with , plavix 600 mg, and heparin 5000. He was found to have TERI in leads VI, V2, and AVR. Troponin .18, . CKMB 1.7. SL nitro relieved his pain. In transit, he was hypotensive to the 80s and received 200 mL of fluids.    Upon arrival to the CCU, his chest pain was mild and he was febrile to 101.6. Mr. Robertson is a 59 yo M with a PMH of severe aortic stenosis, PVD s/p L femoral stent and BKA, T2DM, HTN, HLD, and ESRD (T/Th/F) who presented to [facility] with abdominal pain and was transferred to [facility] for STEMI that was determined to be, most likely, demand mediated ischemia.    On 10/30, he presented for RUQ pain. Ultrasound demonstrated cholelithiasis without cholecystitis. HIDA scan was negative. He was treated with flagyl from 11/1-11/4 and cipro 11/1-11/5. He was deemed not to be a surgical candidate for lap ella for renal stones due to multiple comorbidities. He was pain free and afebrile from 11/5 through 11/8 when he was planned for discharge to rehab. He last received dialysis on Thursday 11/7    Then, at 3:50am on 11/9, he began to have 9/10 substernal chest pain mainly on the left chest that also radiated to his right. He was loaded with , plavix 600 mg, and heparin 5000. He was found to have TERI in leads VI, V2, and AVR. Troponin .18, . CKMB 1.7. SL nitro relieved his pain. In transit, he was hypotensive to the 80s and received 200 mL of fluids.    Upon arrival to the CCU, his chest pain was mild and he was febrile to 101.6. He became hypotensive to systolics of 50s. A right IJ TLC was placed and levophed was started.

## 2019-11-09 NOTE — CONSULT NOTE ADULT - ATTENDING COMMENTS
Pt c hx of ESRD transferred from [facility] with TERI on EKG, found to be hypotensive, low EF, febrile  Pt resting comfortable, on pressors  Clinically volume status looks ok  Electrolytes stable  Will hold off on dialysis for now as pt hypotensive and not significantly overloaded.

## 2019-11-10 LAB
ALBUMIN SERPL ELPH-MCNC: 3.2 G/DL — LOW (ref 3.3–5)
ALP SERPL-CCNC: 87 U/L — SIGNIFICANT CHANGE UP (ref 40–120)
ALT FLD-CCNC: 22 U/L — SIGNIFICANT CHANGE UP (ref 10–45)
ANION GAP SERPL CALC-SCNC: 16 MMOL/L — SIGNIFICANT CHANGE UP (ref 5–17)
APTT BLD: 48.2 SEC — HIGH (ref 27.5–36.3)
AST SERPL-CCNC: 83 U/L — HIGH (ref 10–40)
BILIRUB SERPL-MCNC: 0.4 MG/DL — SIGNIFICANT CHANGE UP (ref 0.2–1.2)
BUN SERPL-MCNC: 43 MG/DL — HIGH (ref 7–23)
CALCIUM SERPL-MCNC: 10.6 MG/DL — HIGH (ref 8.4–10.5)
CHLORIDE SERPL-SCNC: 91 MMOL/L — LOW (ref 96–108)
CK MB BLD-MCNC: 10.2 % — HIGH (ref 0–3.5)
CK MB CFR SERPL CALC: 60.5 NG/ML — HIGH (ref 0–6.7)
CK SERPL-CCNC: 593 U/L — HIGH (ref 30–200)
CO2 SERPL-SCNC: 22 MMOL/L — SIGNIFICANT CHANGE UP (ref 22–31)
CREAT SERPL-MCNC: 9.66 MG/DL — HIGH (ref 0.5–1.3)
GAS PNL BLDA: SIGNIFICANT CHANGE UP
GLUCOSE SERPL-MCNC: 209 MG/DL — HIGH (ref 70–99)
HBA1C BLD-MCNC: 6.1 % — HIGH (ref 4–5.6)
HCT VFR BLD CALC: 31.4 % — LOW (ref 39–50)
HCV AB S/CO SERPL IA: 0.19 S/CO — SIGNIFICANT CHANGE UP (ref 0–0.99)
HCV AB SERPL-IMP: SIGNIFICANT CHANGE UP
HGB BLD-MCNC: 10.2 G/DL — LOW (ref 13–17)
MAGNESIUM SERPL-MCNC: 3.1 MG/DL — HIGH (ref 1.6–2.6)
MCHC RBC-ENTMCNC: 29.4 PG — SIGNIFICANT CHANGE UP (ref 27–34)
MCHC RBC-ENTMCNC: 32.5 GM/DL — SIGNIFICANT CHANGE UP (ref 32–36)
MCV RBC AUTO: 90.5 FL — SIGNIFICANT CHANGE UP (ref 80–100)
NRBC # BLD: 0 /100 WBCS — SIGNIFICANT CHANGE UP (ref 0–0)
PHOSPHATE SERPL-MCNC: 5.2 MG/DL — HIGH (ref 2.5–4.5)
PLATELET # BLD AUTO: 337 K/UL — SIGNIFICANT CHANGE UP (ref 150–400)
POTASSIUM SERPL-MCNC: 4.6 MMOL/L — SIGNIFICANT CHANGE UP (ref 3.5–5.3)
POTASSIUM SERPL-SCNC: 4.6 MMOL/L — SIGNIFICANT CHANGE UP (ref 3.5–5.3)
PROT SERPL-MCNC: 7 G/DL — SIGNIFICANT CHANGE UP (ref 6–8.3)
RBC # BLD: 3.47 M/UL — LOW (ref 4.2–5.8)
RBC # FLD: 15.3 % — HIGH (ref 10.3–14.5)
SODIUM SERPL-SCNC: 129 MMOL/L — LOW (ref 135–145)
TROPONIN T, HIGH SENSITIVITY RESULT: 2253 NG/L — HIGH (ref 0–51)
WBC # BLD: 11.3 K/UL — HIGH (ref 3.8–10.5)
WBC # FLD AUTO: 11.3 K/UL — HIGH (ref 3.8–10.5)

## 2019-11-10 PROCEDURE — 93010 ELECTROCARDIOGRAM REPORT: CPT

## 2019-11-10 RX ORDER — CHLORHEXIDINE GLUCONATE 213 G/1000ML
15 SOLUTION TOPICAL EVERY 12 HOURS
Refills: 0 | Status: DISCONTINUED | OUTPATIENT
Start: 2019-11-10 | End: 2019-11-10

## 2019-11-10 RX ORDER — FENTANYL CITRATE 50 UG/ML
0.5 INJECTION INTRAVENOUS
Qty: 2500 | Refills: 0 | Status: DISCONTINUED | OUTPATIENT
Start: 2019-11-10 | End: 2019-11-10

## 2019-11-10 RX ADMIN — PIPERACILLIN AND TAZOBACTAM 25 GRAM(S): 4; .5 INJECTION, POWDER, LYOPHILIZED, FOR SOLUTION INTRAVENOUS at 05:20

## 2019-11-10 RX ADMIN — HEPARIN SODIUM 1200 UNIT(S)/HR: 5000 INJECTION INTRAVENOUS; SUBCUTANEOUS at 05:20

## 2019-11-10 NOTE — PROCEDURE NOTE - NSPROCDETAILS_GEN_ALL_CORE
Seldinger technique/all materials/supplies accounted for at end of procedure/sutured in place/positive blood return obtained via catheter/location identified, draped/prepped, sterile technique used, needle inserted/introduced/connected to a pressurized flush line/hemostasis with direct pressure, dressing applied

## 2019-11-10 NOTE — DISCHARGE NOTE FOR THE EXPIRED PATIENT - SECONDARY DIAGNOSIS.
ESRD on dialysis Peripheral vascular disease Type 2 diabetes mellitus NSTEMI (non-ST elevation myocardial infarction)

## 2019-11-10 NOTE — PROCEDURE NOTE - ADDITIONAL PROCEDURE DETAILS
Called on STAT pager for emergency intubation. On arrival to CCU, CPR ongoing and bag mask ventilation with 100% FiO2 being performed.  History briefly reviewed with housestaff.  DL x 1 with MAC 4 blade, grade 1 view, 7.5 mm cuffed ETT passed without trauma, + ETCO2 via EasyCap, + b/l BS, taped at 23 cm, teeth intact, no complications. Called on STAT pager for emergency intubation. On arrival to CCU, CPR ongoing and bag mask ventilation with 100% FiO2 being performed. History briefly reviewed with CCU team.  DL x 1 with MAC 4 blade, grade 1 view, 7.5 mm cuffed ETT passed without trauma, + sustained ETCO2 via EasyCap, + B/L BS, taped at 23 cm, teeth intact, no complications. CPR continued by CCU team, CXR to confirm ETT position recommended.

## 2019-11-10 NOTE — PROCEDURE NOTE - NSINDICATIONS_GEN_A_CORE
cardiac arrest
blood sampling/critical patient/arterial puncture to obtain ABG's/monitoring purposes

## 2019-11-10 NOTE — CHART NOTE - NSCHARTNOTEFT_GEN_A_CORE
Patient had witnessed Vfib arrest followed by ROSC then followed by PEA arrest.  EKG rhythm strip shows asystole.   Patient pronounced dead at ___. Attending notified.  Family__________. Autopsy ________.    Alex Cardenas MD Patient had witnessed Vfib arrest followed by ROSC then followed by PEA arrest.  EKG rhythm strip shows asystole. POCUS showed agonal beats. There was no blood pressure without compressions on maximum levophed.  Patient pronounced dead at ___. Attending notified.  Family__________. Autopsy ________.    Alex Cardenas MD Patient had witnessed Vfib arrest followed by ROSC then followed by PEA arrest.  Telemetry shows asystole. POCUS showed agonal beats. There was no blood pressure without compressions on maximum levophed. Unresponsive. Apneic.  Patient pronounced dead at 6:28am. Attending notified.  Family Nicole Rudolphtimore. Autopsy offered. The family's final decision on autopsy is pending.    Alex Cardenas MD

## 2019-11-10 NOTE — CHART NOTE - NSCHARTNOTEFT_GEN_A_CORE
Pt w/ VF on tele. ACLS started, Cards fellows Dr. Cardenas and Dr. Butler @ bedside. Pt shocked multiple times for VF. Intubated by anesthesia. In addition to ACLS protocol, pt given NaHCO3, CaCl, Mg, Amio pushes, lido pushes.  Levo gtt maxed out, epi gtt started and maxed out, lido gtt started @ 2. Pt w/ ROSC, HTN 160s systolic, RRA line placed, pt following commands, breathing 30-35 x/min, attempting to pull out ETT, fent gtt started for sedation. Pt became acutely hypertensive and hypotensive. Pt then w/ PEA arrest, ACLS started. Unable to obtain ROSC. No objections to ending code @ 0628. Multiple attempts made to reach wife who had called earlier in the shift, no contact number available     Subha SANCHEZCNP-BC/CCU  spectra #[phone]  beeper #[id number]

## 2019-11-10 NOTE — DISCHARGE NOTE FOR THE EXPIRED PATIENT - HOSPITAL COURSE
Mr. Robertson is a 57 yo M with PMH of HTN, HLD, T2DM, PVD, ESRD, and aortic stenosis who originally presented to [facility] for abdominal pain. He was diagnosed based off of abdominal US. His abdominal pain resolved and he completed a 5 day course of antibiotics. He was afebrile and pending discharge when he developed severe substernal chest pain. He was found to have an NSTEMI based on EKG with high risk features and cardiac enzymes. He was started on ASA and plavix load and heparin. At [facility], DAPT, statin and heparin were continued. He was found to be febrile and started on antibiotics. He was hypotensive requiring levophed. CVVH was deferred because there was no acute clinical indication for dialysis. His chest pain improved. On the morning of 11/10, he went into vfib arrest. Multiple shocks. NaHCO3, CaCl, Mg, Amio pushes, lido pushes.  Levo gtt maxed out, epi gtt started and maxed out, lido gtt started @ 2. Pt w/ ROSC, HTN 160s systolic, RRA line placed, pt following commands, breathing 30-35 x/min, attempting to pull out ETT, fent gtt started for sedation. Pt became acutely hypertensive and hypotensive. Pt then w/ PEA arrest, ACLS started. Unable to obtain ROSC. Asystole on monitor with agonal beats. No pressure on A-line. No pressure on BP cuff. Apneic. No objections to ending code @ 0628.

## 2019-11-14 ENCOUNTER — RESULT REVIEW (OUTPATIENT)
Age: 59
End: 2019-11-14

## 2019-11-14 LAB
CULTURE RESULTS: SIGNIFICANT CHANGE UP
CULTURE RESULTS: SIGNIFICANT CHANGE UP
SPECIMEN SOURCE: SIGNIFICANT CHANGE UP
SPECIMEN SOURCE: SIGNIFICANT CHANGE UP

## 2019-11-14 PROCEDURE — 85730 THROMBOPLASTIN TIME PARTIAL: CPT

## 2019-11-14 PROCEDURE — 82550 ASSAY OF CK (CPK): CPT

## 2019-11-14 PROCEDURE — 82803 BLOOD GASES ANY COMBINATION: CPT

## 2019-11-14 PROCEDURE — 82553 CREATINE MB FRACTION: CPT

## 2019-11-14 PROCEDURE — 84443 ASSAY THYROID STIM HORMONE: CPT

## 2019-11-14 PROCEDURE — 71045 X-RAY EXAM CHEST 1 VIEW: CPT

## 2019-11-14 PROCEDURE — 83735 ASSAY OF MAGNESIUM: CPT

## 2019-11-14 PROCEDURE — 83605 ASSAY OF LACTIC ACID: CPT

## 2019-11-14 PROCEDURE — 87040 BLOOD CULTURE FOR BACTERIA: CPT

## 2019-11-14 PROCEDURE — 82330 ASSAY OF CALCIUM: CPT

## 2019-11-14 PROCEDURE — 80061 LIPID PANEL: CPT

## 2019-11-14 PROCEDURE — 82947 ASSAY GLUCOSE BLOOD QUANT: CPT

## 2019-11-14 PROCEDURE — 88313 SPECIAL STAINS GROUP 2: CPT

## 2019-11-14 PROCEDURE — 85610 PROTHROMBIN TIME: CPT

## 2019-11-14 PROCEDURE — 82565 ASSAY OF CREATININE: CPT

## 2019-11-14 PROCEDURE — 84132 ASSAY OF SERUM POTASSIUM: CPT

## 2019-11-14 PROCEDURE — 82435 ASSAY OF BLOOD CHLORIDE: CPT

## 2019-11-14 PROCEDURE — 93005 ELECTROCARDIOGRAM TRACING: CPT

## 2019-11-14 PROCEDURE — 88312 SPECIAL STAINS GROUP 1: CPT | Mod: 26

## 2019-11-14 PROCEDURE — 84100 ASSAY OF PHOSPHORUS: CPT

## 2019-11-14 PROCEDURE — 80053 COMPREHEN METABOLIC PANEL: CPT

## 2019-11-14 PROCEDURE — 83036 HEMOGLOBIN GLYCOSYLATED A1C: CPT

## 2019-11-14 PROCEDURE — 88313 SPECIAL STAINS GROUP 2: CPT | Mod: 26

## 2019-11-14 PROCEDURE — 80202 ASSAY OF VANCOMYCIN: CPT

## 2019-11-14 PROCEDURE — 85027 COMPLETE CBC AUTOMATED: CPT

## 2019-11-14 PROCEDURE — 84484 ASSAY OF TROPONIN QUANT: CPT

## 2019-11-14 PROCEDURE — 86850 RBC ANTIBODY SCREEN: CPT

## 2019-11-14 PROCEDURE — 84295 ASSAY OF SERUM SODIUM: CPT

## 2019-11-14 PROCEDURE — 86803 HEPATITIS C AB TEST: CPT

## 2019-11-14 PROCEDURE — 85014 HEMATOCRIT: CPT

## 2019-11-14 PROCEDURE — 93306 TTE W/DOPPLER COMPLETE: CPT

## 2019-11-14 PROCEDURE — 94002 VENT MGMT INPAT INIT DAY: CPT

## 2019-11-14 PROCEDURE — 86901 BLOOD TYPING SEROLOGIC RH(D): CPT

## 2019-11-14 PROCEDURE — 88312 SPECIAL STAINS GROUP 1: CPT

## 2019-11-14 PROCEDURE — 86900 BLOOD TYPING SEROLOGIC ABO: CPT

## 2019-11-14 PROCEDURE — 82962 GLUCOSE BLOOD TEST: CPT

## 2024-06-09 NOTE — CONSULT NOTE ADULT - ASSESSMENT
foot pain/injury 58y old male with a PMHx of PVD s/p L femoral stent and BKA, T2DM, HTN, HLD, and ESRD TIW (TTS) transferred to [facility] for STEMI concern. Nephrology consulted for ESRD      #ESRD         #HTN    #Anemia    #Secondary hyperparathyroidism 58y old male with a PMHx of PVD s/p L femoral stent and BKA, T2DM, HTN, HLD, and ESRD TIW (TTS) transferred to [facility] for STEMI concern. Nephrology consulted for ESRD      #ESRD   Pt. with ESRD on HD three times a week (TTS). Last HD was on 11/7 via AVF Pt. clinically stable. Labs reviewed. Given pt's hypotension/ sepsis and IV pressor requirement will hold off on maintenance HD session. Pt is consented for HD/CRRT.  Currently not grossly volume overloaded, CVP 9.  Monitor labs, monitor volume status  Will reassess need for HD/CRRT    #HTN  - Pt currently hypotensive, hold all home BP meds  - C/w Levophed gtt for BP support    #Anemia  Patient with anemia in the setting of ESRD. Hemoglobin below target range. Will need to determine if patient receives ROBBIN.   Please check iron studies and ferritin  No IV Iron in setting of possible infection  Monitor hemoglobin.      #Secondary hyperparathyroidism  Please monitor phos and calcium level daily.  Check iPTH level  Can hold phosphorus binders while pt is NPO